# Patient Record
Sex: FEMALE | Race: WHITE
[De-identification: names, ages, dates, MRNs, and addresses within clinical notes are randomized per-mention and may not be internally consistent; named-entity substitution may affect disease eponyms.]

---

## 2017-06-29 NOTE — OR
DATE OF PROCEDURE:  06/28/2017

 

PREOPERATIVE DIAGNOSES:

1. History of colon polyps.

2. History of ovarian cancer.

 

POSTOPERATIVE DIAGNOSES:

1. Unremarkable colonoscopy.

2. History of colon polyps.

3. History of ovarian cancer.

 

PROCEDURE:  Colonoscopy to the cecum.



SURGEON:  Aydin Platt MD.

 

ANESTHESIA:  IV anesthesia with monitored anesthesia care.

 

INDICATIONS:  This 61-year-old white female is referred for a colonoscopy.  She 
has a

history of ovarian cancer and colon polyps.  Her last colonoscopic exam was 
done five years

ago.  I counseled her for the procedure including risks and alternatives, and 
she gave her

informed consent to proceed.

 

DESCRIPTION OF PROCEDURE:  The patient was placed in the left lateral decubitus 
position.

IV anesthesia was administered by the Anesthesia Service.  Time-out was held.  
A rectal 

exam was performed, which was unremarkable.  The flexible video Olympus 
colonoscope 

was introduced through her anus, up her rectum, and out her colon all the way 
to the cecum.

Once the cecum was reached, the scope was slowly withdrawn examining the mucosa 
throughout.

No mucosal abnormalities were noted except for a linear scar which has been 
noted in the 

past in the area of the splenic flexure.  The scope was retroflexed in the 
rectum with the distal

rectum appearing unremarkable.  The scope was straightened and removed.  She 
tolerated the

procedure well.

 

 

 

 

Aydin Platt MD

DD:  06/28/2017 10:34:46

DT:  06/28/2017 19:57:50

Job #:  [id number]

MTDD

## 2022-07-14 ENCOUNTER — HOSPITAL ENCOUNTER (OUTPATIENT)
Dept: HOSPITAL 11 - JP.SDS | Age: 67
Discharge: HOME | End: 2022-07-14
Attending: FAMILY MEDICINE
Payer: MEDICARE

## 2022-07-14 VITALS — HEART RATE: 50 BPM | DIASTOLIC BLOOD PRESSURE: 72 MMHG | SYSTOLIC BLOOD PRESSURE: 114 MMHG

## 2022-07-14 DIAGNOSIS — Z88.1: ICD-10-CM

## 2022-07-14 DIAGNOSIS — Z86.010: ICD-10-CM

## 2022-07-14 DIAGNOSIS — Z98.890: ICD-10-CM

## 2022-07-14 DIAGNOSIS — Z12.11: Primary | ICD-10-CM

## 2022-07-14 DIAGNOSIS — Z88.5: ICD-10-CM

## 2022-07-14 DIAGNOSIS — J45.909: ICD-10-CM

## 2022-07-14 DIAGNOSIS — D12.3: ICD-10-CM

## 2022-07-14 DIAGNOSIS — D12.2: ICD-10-CM

## 2022-07-14 DIAGNOSIS — Z85.43: ICD-10-CM

## 2022-07-14 PROCEDURE — 45380 COLONOSCOPY AND BIOPSY: CPT
